# Patient Record
Sex: FEMALE | Race: BLACK OR AFRICAN AMERICAN
[De-identification: names, ages, dates, MRNs, and addresses within clinical notes are randomized per-mention and may not be internally consistent; named-entity substitution may affect disease eponyms.]

---

## 2020-05-28 ENCOUNTER — HOSPITAL ENCOUNTER (EMERGENCY)
Dept: HOSPITAL 72 - EMR | Age: 55
Discharge: HOME | End: 2020-05-28
Payer: COMMERCIAL

## 2020-05-28 VITALS — BODY MASS INDEX: 34.86 KG/M2 | HEIGHT: 68 IN | WEIGHT: 230 LBS

## 2020-05-28 VITALS — SYSTOLIC BLOOD PRESSURE: 125 MMHG | DIASTOLIC BLOOD PRESSURE: 65 MMHG

## 2020-05-28 DIAGNOSIS — M19.90: ICD-10-CM

## 2020-05-28 DIAGNOSIS — R20.2: Primary | ICD-10-CM

## 2020-05-28 PROCEDURE — 99282 EMERGENCY DEPT VISIT SF MDM: CPT

## 2020-05-28 NOTE — NUR
ED Nurse Note:



Pt from home walked in due to right foot numbness x 2 months. hx of 
osteoarthritis. AAO x4 and ambulates with steady gait.

## 2020-05-28 NOTE — EMERGENCY ROOM REPORT
History of Present Illness


General


Chief Complaint:  Lower Extremity Injury


Source:  Patient





Present Illness


HPI


Patient is a 54-year-old female past medical history of osteoarthritis who 

presents to the ER complaining of numbness and tingling to her right toes for 

the past 2 to 3 months.  She denies any trauma.  She states that she is able to 

ambulate.  She states that she has not seen her doctor due to the COVID-19 

pandemic.  Patient denies any lower extremity swelling or redness.  Patient 

states that he just feels like her toes are sleeping.


Allergies:  


Coded Allergies:  


     No Known Allergies (Unverified , 5/28/20)





COVID-19 Screening


Contact w/high risk pt:  No


Recent Travel to affected area:  No


Experienced COVID-19 symptoms?:  No


COVID-19 Testing performed PTA:  No





Patient History


Pregnant Now:  No


Reviewed Nursing Documentation:  PMH: Agreed; PSxH: Agreed





Review of Systems


All Other Systems:  negative except mentioned in HPI





Physical Exam





Vital Signs








  Date Time  Temp Pulse Resp B/P (MAP) Pulse Ox O2 Delivery O2 Flow Rate FiO2


 


5/28/20 10:11 98.1 108 21 112/57 (75) 95 Room Air  








Sp02 EP Interpretation:  reviewed, normal


General Appearance:  no apparent distress, alert, GCS 15, non-toxic


Head:  normocephalic, atraumatic


Eyes:  bilateral eye normal inspection, bilateral eye PERRL


ENT:  hearing grossly normal, normal pharynx, no angioedema, normal voice


Neck:  full range of motion, supple/symm/no masses


Respiratory:  chest non-tender, lungs clear, normal breath sounds, speaking 

full sentences


Cardiovascular #1:  regular rate, rhythm, no edema


Cardiovascular #2:  2+ dorsalis pedis (R), 2+ dorsalis pedis (L)


Gastrointestinal:  normal bowel sounds, non tender, soft, non-distended, no 

guarding, no rebound


Rectal:  deferred


Musculoskeletal:  other - Complains of paresthesias to R toes, cap refill 

immediate, no swelling, no redness, 2+ pedal pulses bilaterally.,  Normal 

strength warm extremity


Neurologic:  CNs III-XII nml as tested


Psychiatric:  no suicidal/homicidal ideation


Skin:  no rash


Lymphatic:  no adenopathy





Medical Decision Making


Diagnostic Impression:  


 Primary Impression:  


 Paresthesia


ER Course


Patient has paresthesias to her right toes for the past 2 to 3 months.  She is 

ambulating without difficulty.  Extremity is warm and well-perfused.  We will 

try gabapentin.  Patient states she will follow-up with her primary care 

physician if it is not improved.  After discussing risks and benefits of 

further diagnostics, treatment plans, as well as indications for and risks of 

admission, the patient is agreeable to being discharged home.  I have explained 

that their evaluation and treatment in the emergency department today is an 

important step towards them achieving better health but that their evaluation 

today is not intended to replace further evaluation and treatment by a 

physician in their local clinic.  I have explained that while the current 

findings suggest no immediate life threatening emergency they will require 

further evaluation and treatment by a physician of their choice in their area.  

They understand that it will be necessary for them to review the final reports 

of their ED visit with their clinic physician.  We have reviewed indications 

for return to the Emergency Department.  I have explained that additional time 

may need to pass and/or additional testing as an outpatient may be necessary 

before a definitive diagnosis can be made.  They tell me they are willing to 

follow up as instructed within the timeframe I recommend.  They appear to 

understand what we discussed.  Additionally they understand that if they are 

unable to be seen by an outpatient physician they are welcome, and in fact 

should, return to the Emergency Department for a repeat evaluation.  The 

patient is stable at time of discharge.





Last Vital Signs








  Date Time  Temp Pulse Resp B/P (MAP) Pulse Ox O2 Delivery O2 Flow Rate FiO2


 


5/28/20 10:11 98.1 108 21 112/57 (75) 95 Room Air  








Disposition:  HOME, SELF-CARE


Condition:  Stable


Scripts


Gabapentin (Neurontin) 300 Mg Capsule


300 MG ORAL THREE TIMES A DAY for 30 Days, CAP 0 Refills


   Take 300mg PO once day one


   Take 300mg PO bid day two


   Take 300mg PO TID days 3-30


   Prov: Jennifer Pack M.D.         5/28/20


Referrals:  


NON PHYSICIAN (PCP)


Patient Instructions:  Paresthesia, Easy-to-Read





Additional Instructions:  


The patient was provided with discharge instructions, notified to follow-up 

with a primary care doctor and or specialist in the next 24-48 hours, and to 

return to the ED if they have worsening of their symptoms. 





Please note that this report is being documented using DRAGON technology.


This can lead to erroneous entry secondary to incorrect interpretation by the 

dictating instrument.











Jennifer Pack M.D. May 28, 2020 10:29

## 2020-10-05 ENCOUNTER — HOSPITAL ENCOUNTER (EMERGENCY)
Dept: HOSPITAL 72 - EMR | Age: 55
Discharge: ANOTHER HOSPITAL | End: 2020-10-05
Payer: COMMERCIAL

## 2020-10-05 VITALS — BODY MASS INDEX: 31.83 KG/M2 | WEIGHT: 210 LBS | HEIGHT: 68 IN

## 2020-10-05 VITALS — DIASTOLIC BLOOD PRESSURE: 82 MMHG | SYSTOLIC BLOOD PRESSURE: 125 MMHG

## 2020-10-05 VITALS — DIASTOLIC BLOOD PRESSURE: 80 MMHG | SYSTOLIC BLOOD PRESSURE: 127 MMHG

## 2020-10-05 VITALS — DIASTOLIC BLOOD PRESSURE: 83 MMHG | SYSTOLIC BLOOD PRESSURE: 117 MMHG

## 2020-10-05 VITALS — DIASTOLIC BLOOD PRESSURE: 84 MMHG | SYSTOLIC BLOOD PRESSURE: 124 MMHG

## 2020-10-05 DIAGNOSIS — F17.200: ICD-10-CM

## 2020-10-05 DIAGNOSIS — R07.9: ICD-10-CM

## 2020-10-05 DIAGNOSIS — N61.0: Primary | ICD-10-CM

## 2020-10-05 LAB
ADD MANUAL DIFF: NO
ALBUMIN SERPL-MCNC: 3.7 G/DL (ref 3.4–5)
ALBUMIN/GLOB SERPL: 0.9 {RATIO} (ref 1–2.7)
ALP SERPL-CCNC: 139 U/L (ref 46–116)
ALT SERPL-CCNC: 22 U/L (ref 12–78)
ANION GAP SERPL CALC-SCNC: 5 MMOL/L (ref 5–15)
AST SERPL-CCNC: 16 U/L (ref 15–37)
BASOPHILS NFR BLD AUTO: 1.3 % (ref 0–2)
BILIRUB SERPL-MCNC: 0.3 MG/DL (ref 0.2–1)
BUN SERPL-MCNC: 7 MG/DL (ref 7–18)
CALCIUM SERPL-MCNC: 9.3 MG/DL (ref 8.5–10.1)
CHLORIDE SERPL-SCNC: 100 MMOL/L (ref 98–107)
CO2 SERPL-SCNC: 29 MMOL/L (ref 21–32)
CREAT SERPL-MCNC: 0.7 MG/DL (ref 0.55–1.3)
EOSINOPHIL NFR BLD AUTO: 0.6 % (ref 0–3)
ERYTHROCYTE [DISTWIDTH] IN BLOOD BY AUTOMATED COUNT: 12.7 % (ref 11.6–14.8)
GLOBULIN SER-MCNC: 3.9 G/DL
HCT VFR BLD CALC: 44.3 % (ref 37–47)
HGB BLD-MCNC: 14.8 G/DL (ref 12–16)
LYMPHOCYTES NFR BLD AUTO: 36.6 % (ref 20–45)
MCV RBC AUTO: 86 FL (ref 80–99)
MONOCYTES NFR BLD AUTO: 5.5 % (ref 1–10)
NEUTROPHILS NFR BLD AUTO: 56 % (ref 45–75)
PLATELET # BLD: 254 K/UL (ref 150–450)
POTASSIUM SERPL-SCNC: 4.3 MMOL/L (ref 3.5–5.1)
RBC # BLD AUTO: 5.14 M/UL (ref 4.2–5.4)
SODIUM SERPL-SCNC: 134 MMOL/L (ref 136–145)
WBC # BLD AUTO: 3.6 K/UL (ref 4.8–10.8)

## 2020-10-05 PROCEDURE — 80053 COMPREHEN METABOLIC PANEL: CPT

## 2020-10-05 PROCEDURE — 96361 HYDRATE IV INFUSION ADD-ON: CPT

## 2020-10-05 PROCEDURE — 83880 ASSAY OF NATRIURETIC PEPTIDE: CPT

## 2020-10-05 PROCEDURE — 71045 X-RAY EXAM CHEST 1 VIEW: CPT

## 2020-10-05 PROCEDURE — 85025 COMPLETE CBC W/AUTO DIFF WBC: CPT

## 2020-10-05 PROCEDURE — 84484 ASSAY OF TROPONIN QUANT: CPT

## 2020-10-05 PROCEDURE — 99284 EMERGENCY DEPT VISIT MOD MDM: CPT

## 2020-10-05 PROCEDURE — 93005 ELECTROCARDIOGRAM TRACING: CPT

## 2020-10-05 PROCEDURE — 96365 THER/PROPH/DIAG IV INF INIT: CPT

## 2020-10-05 PROCEDURE — 36415 COLL VENOUS BLD VENIPUNCTURE: CPT

## 2020-10-05 NOTE — EMERGENCY ROOM REPORT
History of Present Illness


General


Chief Complaint:  Skin Rash/Abscess


Source:  Patient





Present Illness


HPI


Patient 55-year-old female presents for increased pain to her left breast.  

Patient states that she had recently attempted to drain a skin lesion.  

Subsequent noticed increased pain and swelling to the left breast.  3 days of 

discomfort.  Denies prior history of breast disease.  She also reports having in

creased chest discomfort with associated muscle spasming and shortness of 

breath.  Denies any cough.  Reports having previous negative coronavirus 

testing.  Patient states that she is a smoker.  She had previously been told 

that she had possibly diabetes.


Allergies:  


Coded Allergies:  


     No Known Allergies (Unverified , 5/28/20)





COVID-19 Screening


Contact w/high risk pt:  No


Recent Travel to affected area:  No


Experienced COVID-19 symptoms?:  No


COVID-19 Testing performed PTA:  No





Patient History


Past Medical History:  see triage record


Reviewed Nursing Documentation:  PMH: Agreed; PSxH: Agreed





Review of Systems


All Other Systems:  negative except mentioned in HPI





Physical Exam





Vital Signs








  Date Time  Temp Pulse Resp B/P (MAP) Pulse Ox O2 Delivery O2 Flow Rate FiO2


 


10/5/20 10:12 98.4 32 18 119/82 (94) 95 Room Air  








Sp02 EP Interpretation:  reviewed, normal


General Appearance:  normal inspection, well appearing, no apparent distress, 

alert, GCS 15


Head:  atraumatic


ENT:  normal ENT inspection, hearing grossly normal, normal voice


Neck:  normal inspection, full range of motion, supple, no bony tend


Respiratory:  normal inspection, lungs clear, normal breath sounds, no 

respiratory distress, no retraction, no wheezing


Cardiovascular #1:  regular rate, rhythm, no edema


Gastrointestinal:  normal inspection, normal bowel sounds, non tender, soft, no 

guarding, no hernia


Genitourinary:  no CVA tenderness


Musculoskeletal:  normal inspection, back normal, normal range of motion


Neurologic:  alert, motor strength/tone normal, CNs III-XII nml as tested, 

oriented x3, responsive, speech normal, normal inspection


Psychiatric:  normal inspection, judgement/insight normal, mood/affect normal


Skin:  other - Left-sided breast small ulceration with surrounding erythema 

without any definite mass.





Medical Decision Making


Diagnostic Impression:  


   Primary Impression:  


   Cellulitis of left breast


   Additional Impression:  


   Chest pain in adult


ER Course


Patient presented for breast lesion as well as chest pain.  Differential 

diagnosis include was not limited to pneumonia, bronchitis, myocardial 

infarction, unstable angina among others.  Because of complexity of patient's 

case laboratory tests and imaging studies were ordered.  Given patient's history

of smoking as well as hypertension and possible diabetes.  Patient does have 

some concern for cardiac disease.Patient was discussed with Dr. Mazariegos who 

accepted the patient to [facility] facility at [facility].





Labs








Test


 10/5/20


11:06


 


White Blood Count


 3.6 K/UL


(4.8-10.8)


 


Red Blood Count


 5.14 M/UL


(4.20-5.40)


 


Hemoglobin


 14.8 G/DL


(12.0-16.0)


 


Hematocrit


 44.3 %


(37.0-47.0)


 


Mean Corpuscular Volume 86 FL (80-99) 


 


Mean Corpuscular Hemoglobin


 28.8 PG


(27.0-31.0)


 


Mean Corpuscular Hemoglobin


Concent 33.5 G/DL


(32.0-36.0)


 


Red Cell Distribution Width


 12.7 %


(11.6-14.8)


 


Platelet Count


 254 K/UL


(150-450)


 


Mean Platelet Volume


 8.4 FL


(6.5-10.1)


 


Neutrophils (%) (Auto)


 56.0 %


(45.0-75.0)


 


Lymphocytes (%) (Auto)


 36.6 %


(20.0-45.0)


 


Monocytes (%) (Auto)


 5.5 %


(1.0-10.0)


 


Eosinophils (%) (Auto)


 0.6 %


(0.0-3.0)


 


Basophils (%) (Auto)


 1.3 %


(0.0-2.0)


 


Sodium Level


 134 MMOL/L


(136-145)


 


Potassium Level


 4.3 MMOL/L


(3.5-5.1)


 


Chloride Level


 100 MMOL/L


()


 


Carbon Dioxide Level


 29 MMOL/L


(21-32)


 


Anion Gap


 5 mmol/L


(5-15)


 


Blood Urea Nitrogen 7 mg/dL (7-18) 


 


Creatinine


 0.7 MG/DL


(0.55-1.30)


 


Estimat Glomerular Filtration


Rate > 60 mL/min


(>60)


 


Glucose Level


 380 MG/DL


()


 


Calcium Level


 9.3 MG/DL


(8.5-10.1)


 


Total Bilirubin


 0.3 MG/DL


(0.2-1.0)


 


Aspartate Amino Transf


(AST/SGOT) 16 U/L (15-37) 





 


Alanine Aminotransferase


(ALT/SGPT) 22 U/L (12-78) 





 


Alkaline Phosphatase


 139 U/L


()


 


Troponin I


 0.002 ng/mL


(0.000-0.056)


 


Pro-B-Type Natriuretic Peptide


 25 pg/mL


(0-125)


 


Total Protein


 7.6 G/DL


(6.4-8.2)


 


Albumin


 3.7 G/DL


(3.4-5.0)


 


Globulin 3.9 g/dL 


 


Albumin/Globulin Ratio 0.9 (1.0-2.7) 








EKG Diagnostic Results


Rate:  normal


Rhythm:  NSR


ST Segments:  other - inferior twave inversion





Last Vital Signs








  Date Time  Temp Pulse Resp B/P (MAP) Pulse Ox O2 Delivery O2 Flow Rate FiO2


 


10/5/20 10:27 98.4 72 17 117/83 97 Room Air  








Disposition:  SHORT-TERM HOSP


Condition:  Stable











Jez Perdue MD                Oct 5, 2020 10:58

## 2020-10-06 NOTE — CARDIOLOGY REPORT
--------------- APPROVED REPORT --------------





EKG Measurement

Heart [id number]

[id number]

[id number]

[id number]

[id number]



<Conclusion>

Normal sinus rhythm

Normal ECG